# Patient Record
Sex: FEMALE | Race: WHITE | NOT HISPANIC OR LATINO | URBAN - METROPOLITAN AREA
[De-identification: names, ages, dates, MRNs, and addresses within clinical notes are randomized per-mention and may not be internally consistent; named-entity substitution may affect disease eponyms.]

---

## 2018-02-22 ENCOUNTER — APPOINTMENT (RX ONLY)
Dept: URBAN - METROPOLITAN AREA CLINIC 349 | Facility: CLINIC | Age: 43
Setting detail: DERMATOLOGY
End: 2018-02-22

## 2018-02-22 DIAGNOSIS — L29.89 OTHER PRURITUS: ICD-10-CM

## 2018-02-22 DIAGNOSIS — L91.8 OTHER HYPERTROPHIC DISORDERS OF THE SKIN: ICD-10-CM

## 2018-02-22 DIAGNOSIS — D22 MELANOCYTIC NEVI: ICD-10-CM

## 2018-02-22 DIAGNOSIS — L82.1 OTHER SEBORRHEIC KERATOSIS: ICD-10-CM

## 2018-02-22 DIAGNOSIS — L29.8 OTHER PRURITUS: ICD-10-CM

## 2018-02-22 PROBLEM — D22.5 MELANOCYTIC NEVI OF TRUNK: Status: ACTIVE | Noted: 2018-02-22

## 2018-02-22 PROBLEM — E05.90 THYROTOXICOSIS, UNSPECIFIED WITHOUT THYROTOXIC CRISIS OR STORM: Status: ACTIVE | Noted: 2018-02-22

## 2018-02-22 PROBLEM — E03.9 HYPOTHYROIDISM, UNSPECIFIED: Status: ACTIVE | Noted: 2018-02-22

## 2018-02-22 PROBLEM — F41.9 ANXIETY DISORDER, UNSPECIFIED: Status: ACTIVE | Noted: 2018-02-22

## 2018-02-22 PROBLEM — L55.1 SUNBURN OF SECOND DEGREE: Status: ACTIVE | Noted: 2018-02-22

## 2018-02-22 PROBLEM — L20.84 INTRINSIC (ALLERGIC) ECZEMA: Status: ACTIVE | Noted: 2018-02-22

## 2018-02-22 PROCEDURE — ? RECOMMENDATIONS

## 2018-02-22 PROCEDURE — ? COUNSELING

## 2018-02-22 PROCEDURE — 99203 OFFICE O/P NEW LOW 30 MIN: CPT

## 2018-02-22 ASSESSMENT — LOCATION SIMPLE DESCRIPTION DERM
LOCATION SIMPLE: RIGHT BACK
LOCATION SIMPLE: CHEST
LOCATION SIMPLE: LEFT UPPER BACK
LOCATION SIMPLE: RIGHT LOWER BACK
LOCATION SIMPLE: RIGHT UPPER BACK

## 2018-02-22 ASSESSMENT — LOCATION DETAILED DESCRIPTION DERM
LOCATION DETAILED: LEFT MID-UPPER BACK
LOCATION DETAILED: RIGHT SUPERIOR LATERAL LOWER BACK
LOCATION DETAILED: MIDDLE STERNUM
LOCATION DETAILED: RIGHT SUPERIOR LATERAL UPPER BACK
LOCATION DETAILED: LEFT SUPERIOR UPPER BACK
LOCATION DETAILED: LEFT INFERIOR MEDIAL UPPER BACK
LOCATION DETAILED: RIGHT MID-UPPER BACK

## 2018-02-22 ASSESSMENT — LOCATION ZONE DERM: LOCATION ZONE: TRUNK

## 2018-02-22 NOTE — PROCEDURE: RECOMMENDATIONS
Recommendations (Free Text): Bathe with Dove for sensitive skin bar soap \\nCerave cream apply twice daily for dryness.  Anti itch with pramosone cream.
Detail Level: Zone

## 2018-12-10 PROBLEM — Z80.41 FAMILY HISTORY OF OVARIAN CANCER: Status: ACTIVE | Noted: 2018-12-10

## 2019-01-21 ENCOUNTER — APPOINTMENT (RX ONLY)
Dept: URBAN - METROPOLITAN AREA CLINIC 349 | Facility: CLINIC | Age: 44
Setting detail: DERMATOLOGY
End: 2019-01-21

## 2019-01-21 DIAGNOSIS — L21.8 OTHER SEBORRHEIC DERMATITIS: ICD-10-CM

## 2019-01-21 DIAGNOSIS — L72.0 EPIDERMAL CYST: ICD-10-CM

## 2019-01-21 DIAGNOSIS — Z71.89 OTHER SPECIFIED COUNSELING: ICD-10-CM

## 2019-01-21 DIAGNOSIS — D22 MELANOCYTIC NEVI: ICD-10-CM

## 2019-01-21 PROBLEM — D22.5 MELANOCYTIC NEVI OF TRUNK: Status: ACTIVE | Noted: 2019-01-21

## 2019-01-21 PROBLEM — L70.0 ACNE VULGARIS: Status: ACTIVE | Noted: 2019-01-21

## 2019-01-21 PROBLEM — J30.1 ALLERGIC RHINITIS DUE TO POLLEN: Status: ACTIVE | Noted: 2019-01-21

## 2019-01-21 PROBLEM — D22.4 MELANOCYTIC NEVI OF SCALP AND NECK: Status: ACTIVE | Noted: 2019-01-21

## 2019-01-21 PROCEDURE — 11306 SHAVE SKIN LESION 0.6-1.0 CM: CPT

## 2019-01-21 PROCEDURE — A4550 SURGICAL TRAYS: HCPCS

## 2019-01-21 PROCEDURE — ? SHAVE REMOVAL

## 2019-01-21 PROCEDURE — ? DEFER

## 2019-01-21 PROCEDURE — ? PRESCRIPTION

## 2019-01-21 PROCEDURE — ? TREATMENT REGIMEN

## 2019-01-21 PROCEDURE — 11301 SHAVE SKIN LESION 0.6-1.0 CM: CPT

## 2019-01-21 PROCEDURE — ? OTHER

## 2019-01-21 PROCEDURE — 99213 OFFICE O/P EST LOW 20 MIN: CPT | Mod: 25

## 2019-01-21 PROCEDURE — ? COUNSELING

## 2019-01-21 PROCEDURE — ? EDUCATIONAL RESOURCES PROVIDED

## 2019-01-21 RX ORDER — KETOCONAZOLE 20.5 MG/ML
SHAMPOO, SUSPENSION TOPICAL
Qty: 1 | Refills: 3 | Status: ERX | COMMUNITY
Start: 2019-01-21

## 2019-01-21 RX ORDER — CLOBETASOL PROPIONATE 0.5 MG/ML
SOLUTION TOPICAL
Qty: 1 | Refills: 3 | Status: ERX | COMMUNITY
Start: 2019-01-21

## 2019-01-21 RX ADMIN — CLOBETASOL PROPIONATE: 0.5 SOLUTION TOPICAL at 20:51

## 2019-01-21 RX ADMIN — KETOCONAZOLE: 20.5 SHAMPOO, SUSPENSION TOPICAL at 21:02

## 2019-01-21 ASSESSMENT — LOCATION SIMPLE DESCRIPTION DERM
LOCATION SIMPLE: POSTERIOR NECK
LOCATION SIMPLE: RIGHT UPPER BACK
LOCATION SIMPLE: RIGHT ANTERIOR NECK
LOCATION SIMPLE: HAIR
LOCATION SIMPLE: LEFT UPPER BACK

## 2019-01-21 ASSESSMENT — LOCATION DETAILED DESCRIPTION DERM
LOCATION DETAILED: LEFT INFERIOR LATERAL UPPER BACK
LOCATION DETAILED: RIGHT SUPERIOR MEDIAL UPPER BACK
LOCATION DETAILED: HAIR
LOCATION DETAILED: RIGHT MEDIAL TRAPEZIAL NECK
LOCATION DETAILED: RIGHT CLAVICULAR NECK

## 2019-01-21 ASSESSMENT — LOCATION ZONE DERM
LOCATION ZONE: NECK
LOCATION ZONE: SCALP
LOCATION ZONE: TRUNK

## 2019-01-21 NOTE — PROCEDURE: OTHER
Detail Level: Zone
Other (Free Text): Pt stated she is using a shampoo that is made for psoriasis but is unsure of what it is called.\\n\\nSuggested that pt washes her hair on a daily basis and to blow dry it. Pt has curly hair and washes her hair every other day to avoid frizz.
Note Text (......Xxx Chief Complaint.): This diagnosis correlates with the

## 2019-01-21 NOTE — PROCEDURE: TREATMENT REGIMEN
Detail Level: Zone
Initiate Treatment: Apply Clobetasol scalp solution twice daily when flared. Wash hair daily for two weeks with Ketoconazole shampoo, then twice weekly for maintenance.

## 2019-01-21 NOTE — PROCEDURE: SHAVE REMOVAL
Consent was obtained from the patient. The risks and benefits to therapy were discussed in detail. Specifically, the risks of infection, scarring, bleeding, prolonged wound healing, incomplete removal, allergy to anesthesia, nerve injury and recurrence were addressed. Prior to the procedure, the treatment site was clearly identified and confirmed by the patient. All components of Universal Protocol/PAUSE Rule completed.
X Size Of Lesion In Cm (Optional): 0
Size Of Lesion In Cm (Required): 0.6
Anesthesia Volume In Cc: 0.3
Path Notes (To The Dermatopathologist): Please check margins.
Notification Instructions: Patient will be notified of biopsy results. However, patient instructed to call the office if not contacted within 2 weeks.
Medical Necessity Information: It is in your best interest to select a reason for this procedure from the list below. All of these items fulfill various CMS LCD requirements except the new and changing color options.
Render Post-Care Instructions In Note?: no
Post-Care Instructions: I reviewed with the patient in detail post-care instructions. Patient is to keep the biopsy site dry overnight, and then apply bacitracin twice daily until healed. Patient may apply hydrogen peroxide soaks to remove any crusting.
Accession #: [id number]
Biopsy Method: Dermablade
Billing Type: Third-Party Bill
Detail Level: Detailed
Hemostasis: Electrocautery
Wound Care: Vaseline
Bill For Surgical Tray: yes
Anesthesia Type: 1% Xylocaine without epinephrine
Size Of Lesion In Cm (Required): 0.8
Medical Necessity Clause: This procedure was medically necessary because the lesion is being traumatized

## 2019-01-23 ENCOUNTER — HOSPITAL ENCOUNTER (OUTPATIENT)
Dept: SURGERY | Age: 44
Discharge: HOME | End: 2019-01-23

## 2019-01-23 VITALS — HEIGHT: 67 IN | BODY MASS INDEX: 20.4 KG/M2 | WEIGHT: 130 LBS

## 2019-01-23 RX ORDER — PREGABALIN 100 MG/1
100 CAPSULE ORAL 2 TIMES DAILY
COMMUNITY

## 2019-01-23 NOTE — PERIOP NOTES
Patient verified name and . Order for consent NOT found in EHR and matches case posting; patient verifies procedure. Type 1B surgery, PAT phone assessment complete. Orders NOT received. Labs per surgeon: No orders received. Labs per anesthesia protocol: HGB DOS; order signed and held in [address] [facility]. Patient unable to come to hospital prior to surgery to have lab work completed. Seen by PCP 10/9/18 with HR 37-44, s/p posterior cervical fusion on 18. Patient instructed to go to emergency room, patient reports \"I took my care into my own hands,\" went home d/c all medications and went to ER on 10/11/18 with c/o rapid heart rate, patient placed on monitor with HR between 70-90. ER note states \"tachycardia is possible due to her coming off her Norco.\" Patient reports today HR is \"back to normal.\" Anesthesia to review records and determine if EKG is needed DOS. Patient answered medical/surgical history questions at their best of ability. All prior to admission medications documented in [facility]. Patient instructed to take the following medications the day of surgery according to anesthesia guidelines with a small sip of water: Lyrica, Oxycodone PRN, Tizanidine PRN, Synthroid. Hold all vitamins 7 days prior to surgery and NSAIDS 5 days prior to surgery and naproxen. Patient instructed on the following:  Arrive at [address], time of arrival to be called the day before by 1700  NPO after midnight including gum, mints, and ice chips  Responsible adult must drive patient to the hospital, stay during surgery, and patient will need supervision 24 hours after anesthesia  Use non-moisturizing soap in shower the night before surgery and on the morning of surgery  All piercings must be removed prior to arrival.    Leave all valuables (money and jewelry) at home but bring insurance card and ID on       DOS.    Do not wear make-up, nail polish, lotions, cologne, perfumes, powders, or oil on skin. Patient teach back successful and patient demonstrates knowledge of instruction.

## 2019-01-23 NOTE — PERIOP NOTES
Dr. Jerica Etienne, anesthesia, reviewed pt chart including ED note (10/11/18), PCP note (10/9/18) and note documented in ED note re: pt's report of esophagus being torn during intubation or extubation during 2011 surgery @ [address] [place] [address]. No new orders received. OK to proceed.

## 2019-01-23 NOTE — PERIOP NOTES
Per ED note dated 10/11/18, esophagus was torn during intubation or extubation during 2011 surgery at [address] [place] [address]. No surgery noted in 2011. Patient had surgery 2012 and per Dr. Shruti Bazan (anesthesia) note dated 11/20/12 \"no anesthetic complications. \"    Anesthesia to review.

## 2019-01-28 ENCOUNTER — ANESTHESIA EVENT (OUTPATIENT)
Dept: SURGERY | Age: 44
End: 2019-01-28
Payer: COMMERCIAL

## 2019-01-28 RX ORDER — SODIUM CHLORIDE, SODIUM LACTATE, POTASSIUM CHLORIDE, CALCIUM CHLORIDE 600; 310; 30; 20 MG/100ML; MG/100ML; MG/100ML; MG/100ML
75 INJECTION, SOLUTION INTRAVENOUS CONTINUOUS
Status: CANCELLED | OUTPATIENT
Start: 2019-01-28 | End: 2019-01-29

## 2019-01-28 RX ORDER — MIDAZOLAM HYDROCHLORIDE 1 MG/ML
2 INJECTION, SOLUTION INTRAMUSCULAR; INTRAVENOUS
Status: CANCELLED | OUTPATIENT
Start: 2019-01-28 | End: 2019-01-29

## 2019-01-28 RX ORDER — FENTANYL CITRATE 50 UG/ML
100 INJECTION, SOLUTION INTRAMUSCULAR; INTRAVENOUS ONCE
Status: CANCELLED | OUTPATIENT
Start: 2019-01-28 | End: 2019-01-28

## 2019-01-29 ENCOUNTER — ANESTHESIA (OUTPATIENT)
Dept: SURGERY | Age: 44
End: 2019-01-29
Payer: COMMERCIAL

## 2019-01-29 ENCOUNTER — HOSPITAL ENCOUNTER (OUTPATIENT)
Age: 44
Setting detail: OUTPATIENT SURGERY
Discharge: HOME | End: 2019-01-29
Attending: OBSTETRICS & GYNECOLOGY | Admitting: OBSTETRICS & GYNECOLOGY
Payer: COMMERCIAL

## 2019-01-29 VITALS
SYSTOLIC BLOOD PRESSURE: 141 MMHG | DIASTOLIC BLOOD PRESSURE: 83 MMHG | WEIGHT: 132 LBS | RESPIRATION RATE: 15 BRPM | HEART RATE: 69 BPM | TEMPERATURE: 97.9 F | BODY MASS INDEX: 20.67 KG/M2 | OXYGEN SATURATION: 98 %

## 2019-01-29 DIAGNOSIS — N93.9 ABNORMAL UTERINE BLEEDING (AUB): Primary | ICD-10-CM

## 2019-01-29 LAB
HCG UR QL: NEGATIVE
HGB BLD-MCNC: 13.5 G/DL (ref 11.7–15.4)

## 2019-01-29 PROCEDURE — 74011250636 HC RX REV CODE- 250/636

## 2019-01-29 PROCEDURE — 81025 URINE PREGNANCY TEST: CPT

## 2019-01-29 PROCEDURE — 76010000138 HC OR TIME 0.5 TO 1 HR: Performed by: OBSTETRICS & GYNECOLOGY

## 2019-01-29 PROCEDURE — 74011250637 HC RX REV CODE- 250/637: Performed by: ANESTHESIOLOGY

## 2019-01-29 PROCEDURE — 88305 TISSUE EXAM BY PATHOLOGIST: CPT

## 2019-01-29 PROCEDURE — 74011250636 HC RX REV CODE- 250/636: Performed by: ANESTHESIOLOGY

## 2019-01-29 PROCEDURE — 77030020782 HC GWN BAIR PAWS FLX 3M -B: Performed by: ANESTHESIOLOGY

## 2019-01-29 PROCEDURE — 76060000032 HC ANESTHESIA 0.5 TO 1 HR: Performed by: OBSTETRICS & GYNECOLOGY

## 2019-01-29 PROCEDURE — 77030034928 HC DEV UTER SURSND KT HOLO -G1: Performed by: OBSTETRICS & GYNECOLOGY

## 2019-01-29 PROCEDURE — 85018 HEMOGLOBIN: CPT

## 2019-01-29 PROCEDURE — 77030010509 HC AIRWY LMA MSK TELE -A: Performed by: ANESTHESIOLOGY

## 2019-01-29 PROCEDURE — 77030032490 HC SLV COMPR SCD KNE COVD -B: Performed by: OBSTETRICS & GYNECOLOGY

## 2019-01-29 PROCEDURE — 77030018836 HC SOL IRR NACL ICUM -A: Performed by: OBSTETRICS & GYNECOLOGY

## 2019-01-29 PROCEDURE — 76210000016 HC OR PH I REC 1 TO 1.5 HR: Performed by: OBSTETRICS & GYNECOLOGY

## 2019-01-29 PROCEDURE — 77030012317 HC CATH URET INT COVD -A: Performed by: OBSTETRICS & GYNECOLOGY

## 2019-01-29 RX ORDER — HYDROCODONE BITARTRATE AND ACETAMINOPHEN 5; 325 MG/1; MG/1
2 TABLET ORAL AS NEEDED
Status: DISCONTINUED | OUTPATIENT
Start: 2019-01-29 | End: 2019-01-29 | Stop reason: HOSPADM

## 2019-01-29 RX ORDER — HYDROMORPHONE HYDROCHLORIDE 2 MG/ML
0.5 INJECTION, SOLUTION INTRAMUSCULAR; INTRAVENOUS; SUBCUTANEOUS
Status: DISCONTINUED | OUTPATIENT
Start: 2019-01-29 | End: 2019-01-29 | Stop reason: HOSPADM

## 2019-01-29 RX ORDER — FENTANYL CITRATE 50 UG/ML
INJECTION, SOLUTION INTRAMUSCULAR; INTRAVENOUS AS NEEDED
Status: DISCONTINUED | OUTPATIENT
Start: 2019-01-29 | End: 2019-01-29 | Stop reason: HOSPADM

## 2019-01-29 RX ORDER — LIDOCAINE HYDROCHLORIDE 20 MG/ML
INJECTION, SOLUTION EPIDURAL; INFILTRATION; INTRACAUDAL; PERINEURAL AS NEEDED
Status: DISCONTINUED | OUTPATIENT
Start: 2019-01-29 | End: 2019-01-29 | Stop reason: HOSPADM

## 2019-01-29 RX ORDER — SODIUM CHLORIDE, SODIUM LACTATE, POTASSIUM CHLORIDE, CALCIUM CHLORIDE 600; 310; 30; 20 MG/100ML; MG/100ML; MG/100ML; MG/100ML
75 INJECTION, SOLUTION INTRAVENOUS CONTINUOUS
Status: DISCONTINUED | OUTPATIENT
Start: 2019-01-29 | End: 2019-01-29 | Stop reason: HOSPADM

## 2019-01-29 RX ORDER — DEXAMETHASONE SODIUM PHOSPHATE 4 MG/ML
INJECTION, SOLUTION INTRA-ARTICULAR; INTRALESIONAL; INTRAMUSCULAR; INTRAVENOUS; SOFT TISSUE AS NEEDED
Status: DISCONTINUED | OUTPATIENT
Start: 2019-01-29 | End: 2019-01-29 | Stop reason: HOSPADM

## 2019-01-29 RX ORDER — OXYCODONE HYDROCHLORIDE 5 MG/1
5 TABLET ORAL
Status: DISCONTINUED | OUTPATIENT
Start: 2019-01-29 | End: 2019-01-29 | Stop reason: HOSPADM

## 2019-01-29 RX ORDER — FENTANYL CITRATE 50 UG/ML
100 INJECTION, SOLUTION INTRAMUSCULAR; INTRAVENOUS ONCE
Status: DISCONTINUED | OUTPATIENT
Start: 2019-01-29 | End: 2019-01-29 | Stop reason: HOSPADM

## 2019-01-29 RX ORDER — LIDOCAINE HYDROCHLORIDE 10 MG/ML
0.1 INJECTION INFILTRATION; PERINEURAL AS NEEDED
Status: DISCONTINUED | OUTPATIENT
Start: 2019-01-29 | End: 2019-01-29 | Stop reason: HOSPADM

## 2019-01-29 RX ORDER — PROPOFOL 10 MG/ML
INJECTION, EMULSION INTRAVENOUS AS NEEDED
Status: DISCONTINUED | OUTPATIENT
Start: 2019-01-29 | End: 2019-01-29 | Stop reason: HOSPADM

## 2019-01-29 RX ORDER — KETOROLAC TROMETHAMINE 30 MG/ML
INJECTION, SOLUTION INTRAMUSCULAR; INTRAVENOUS AS NEEDED
Status: DISCONTINUED | OUTPATIENT
Start: 2019-01-29 | End: 2019-01-29 | Stop reason: HOSPADM

## 2019-01-29 RX ORDER — ONDANSETRON 2 MG/ML
INJECTION INTRAMUSCULAR; INTRAVENOUS AS NEEDED
Status: DISCONTINUED | OUTPATIENT
Start: 2019-01-29 | End: 2019-01-29 | Stop reason: HOSPADM

## 2019-01-29 RX ORDER — MIDAZOLAM HYDROCHLORIDE 1 MG/ML
2 INJECTION, SOLUTION INTRAMUSCULAR; INTRAVENOUS
Status: COMPLETED | OUTPATIENT
Start: 2019-01-29 | End: 2019-01-29

## 2019-01-29 RX ORDER — OXYCODONE AND ACETAMINOPHEN 5; 325 MG/1; MG/1
1 TABLET ORAL
Qty: 6 TAB | Refills: 0 | Status: SHIPPED | OUTPATIENT
Start: 2019-01-29

## 2019-01-29 RX ADMIN — FENTANYL CITRATE 50 MCG: 50 INJECTION, SOLUTION INTRAMUSCULAR; INTRAVENOUS at 13:55

## 2019-01-29 RX ADMIN — ONDANSETRON 4 MG: 2 INJECTION INTRAMUSCULAR; INTRAVENOUS at 14:05

## 2019-01-29 RX ADMIN — HYDROCODONE BITARTRATE AND ACETAMINOPHEN 1 TABLET: 5; 325 TABLET ORAL at 15:46

## 2019-01-29 RX ADMIN — PROPOFOL 200 MG: 10 INJECTION, EMULSION INTRAVENOUS at 13:56

## 2019-01-29 RX ADMIN — HYDROMORPHONE HYDROCHLORIDE 0.5 MG: 2 INJECTION, SOLUTION INTRAMUSCULAR; INTRAVENOUS; SUBCUTANEOUS at 14:59

## 2019-01-29 RX ADMIN — HYDROMORPHONE HYDROCHLORIDE 0.5 MG: 2 INJECTION, SOLUTION INTRAMUSCULAR; INTRAVENOUS; SUBCUTANEOUS at 15:04

## 2019-01-29 RX ADMIN — MIDAZOLAM 2 MG: 1 INJECTION INTRAMUSCULAR; INTRAVENOUS at 13:00

## 2019-01-29 RX ADMIN — DEXAMETHASONE SODIUM PHOSPHATE 10 MG: 4 INJECTION, SOLUTION INTRA-ARTICULAR; INTRALESIONAL; INTRAMUSCULAR; INTRAVENOUS; SOFT TISSUE at 14:05

## 2019-01-29 RX ADMIN — LIDOCAINE HYDROCHLORIDE 100 MG: 20 INJECTION, SOLUTION EPIDURAL; INFILTRATION; INTRACAUDAL; PERINEURAL at 13:56

## 2019-01-29 RX ADMIN — KETOROLAC TROMETHAMINE 30 MG: 30 INJECTION, SOLUTION INTRAMUSCULAR; INTRAVENOUS at 14:14

## 2019-01-29 RX ADMIN — SODIUM CHLORIDE, SODIUM LACTATE, POTASSIUM CHLORIDE, AND CALCIUM CHLORIDE: 600; 310; 30; 20 INJECTION, SOLUTION INTRAVENOUS at 14:18

## 2019-01-29 RX ADMIN — SODIUM CHLORIDE, SODIUM LACTATE, POTASSIUM CHLORIDE, AND CALCIUM CHLORIDE 75 ML/HR: 600; 310; 30; 20 INJECTION, SOLUTION INTRAVENOUS at 13:00

## 2019-01-29 RX ADMIN — FENTANYL CITRATE 50 MCG: 50 INJECTION, SOLUTION INTRAMUSCULAR; INTRAVENOUS at 14:14

## 2019-01-29 NOTE — DISCHARGE INSTRUCTIONS
INSTRUCTIONS FOLLOWING HYSTEROSCOPY D&C with ABLATION    ACTIVITY   Limited activity today; increase as tolerated tomorrow, but no vigorous exercise   Shower only; no tub baths   No douches, tampons or intercourse until your doctor releases you (at least 2 weeks)   May return to work or school as directed by your doctor    DIET   Clear liquids until no nausea or vomiting   Advance to regular diet as tolerated    PAIN   Expect a moderate amount of pain.  Take pain medication as directed by your doctor. If no prescription for pain is sent home with you, take the appropriate dose of your commonly used pain medication.  Call your doctor if pain is NOT relieved by medication.  DO NOT take aspirin or blood thinners until directed by your doctor. DRESSING CARE Change gema pads as needed. FOLLOW-UP PHONE [address] will be made by nursing staff   If you have any problems, call your doctor as needed. CALL YOUR DOCTOR IF   Excessive bleeding that soaks a pad in an hour   Temperature of 101°F or above   Green or yellow, smelly discharge   Unable to urinate by bedtime   Nausea and vomiting that does not stop by bedtime    AFTER ANESTHESIA   For the first 24 hours: DO NOT Drive, Drink alcoholic beverages, or Make important decisions.  Beware of dizziness following anesthesia and while taking pain medication.    Plan to stay tonight within 1 hours drive of the hospital

## 2019-01-29 NOTE — PERIOP NOTES
Patient dressed and ready to go home, waiting for Dr Jt Gupta to come and sign prescription for pain medicines

## 2019-01-29 NOTE — ANESTHESIA POSTPROCEDURE EVALUATION
Procedure(s):  DILATATION AND CURETTAGE HYSTEROSCOPY ENDOMETRIAL ABLATION/NOVASURE.     Anesthesia Post Evaluation      Multimodal analgesia: multimodal analgesia used between 6 hours prior to anesthesia start to PACU discharge  Patient location during evaluation: bedside  Patient participation: complete - patient participated  Level of consciousness: awake and alert  Pain score: 3  Pain management: adequate  Airway patency: patent  Anesthetic complications: no  Cardiovascular status: acceptable and hemodynamically stable  Respiratory status: acceptable  Hydration status: acceptable  Post anesthesia nausea and vomiting:  none      Visit Vitals  BP (P) 138/86   Pulse (P) 71   Temp 36.6 °C (97.9 °F)   Resp (P) 15   Wt 59.9 kg (132 lb)   SpO2 (P) 100%   BMI 20.67 kg/m²

## 2019-01-29 NOTE — H&P
Gynecology Major Surgery History and Physical    Chandu Pinto  [id number]    Subjective:      Chief complaint:  abnormal uterine bleeding    Deborrmohinder Rodriguez is a 39 y.o.  female with a history of abnormal uterine bleeding. Previous treatment measures include nonsteroidal anti-inflammatory drugs (NSAID's), observation and oral contraceptives. Ultrasound findings include thickened endometrium and 1 cm thickened endo with 1.7 cm fundal thickening and possible polyp. She is admitted for Procedure(s) (LRB):  DILATATION AND CURETTAGE HYSTEROSCOPY ENDOMETRIAL ABLATION/NOVASURE (N/A)    The current method of family planning is vasectomy. Past Medical History:   Diagnosis Date    Adverse effect of anesthesia     decreased HR and BP after cervical fusion on 9/25/18, hypotension improved s/p blood transfusion. 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  Back pain     Chronic pain     neck and back    Chronic pain syndrome 4/25/2016    Difficult intubation     Per ED note dated 10/11/18, esophagus was torn during intubation or extubation during 2011 surgery at [address] [place] [address]. No surgery noted in 2011. Patient had surgery 2012 and per Dr. Yeni Anne (anesthesia) note dated 11/20/12 \"no anesthetic complications. \"    Displacement of cervical intervertebral disc without myelopathy 11/20/2012    Fibromyalgia     History of bradycardia     Seen by PCP 10/9/18 with HR 38-44, s/p posterior cervical fusion on 9/25/18. Patient instructed to go to emergency room, patient reports \"I took my care into my own hands,\" went home d/c all medications and went to ER on 10/11/18 with c/o rapid heart rate, patient placed on monitor with HR between 70-90.  ER note states \"tachycardia is possible due to her coming off her Norco.\"     History of depression     History of pleural effusion 09/2018    Hypothyroidism     Lumbar disc disease with radiculopathy 1/20/2016    Osteopenia     Reflex sympathetic dystrophy     Sciatica      Past Surgical History:   Procedure Laterality Date    HX BACK SURGERY      Neck surgery - \"synthetic C5\"    HX BREAST AUGMENTATION      HX CERVICAL DISKECTOMY      HX CERVICAL FUSION  2018    HX CYST REMOVAL Right     Wrist ganglion excision     HX CYST REMOVAL Left     Wrist ganglion excision    HX OTHER SURGICAL      spine surg    HX OTHER SURGICAL  2016    Neurostimilator- Mid back, low back and hip.  IMPLANT BREAST SILICONE/EQ Bilateral 0754    augmentation with saline implants     OB History      Para Term  AB Living    6 3 3   3 3    SAB TAB Ectopic Molar Multiple Live Births    3       1 4          Allergies   Allergen Reactions    Tramadol Nausea and Vomiting       Prior to Admission Medications   Prescriptions Last Dose Informant Patient Reported? Taking? SYNTHROID 100 mcg tablet 2019 at Unknown time  No Yes   Sig: Take 1 Tab by mouth Daily (before breakfast). biotin 1 mg tab 2019 at Unknown time  Yes Yes   Sig: Take  by mouth.   calcium-vitamin D 600 mg(1,500mg) -200 unit tab 2019 at Unknown time  Yes Yes   Sig: Take  by mouth. naproxen (NAPROSYN) 500 mg tablet 2019 at Unknown time  Yes Yes   Sig: Take  by mouth every six to eight (6-8) hours as needed. oxyCODONE IR (ROXICODONE) 10 mg tab immediate release tablet   Yes No   Sig: TK 1 T PO  Q 8 H PRN P   pregabalin (LYRICA) 100 mg capsule 2019 at Unknown time  Yes Yes   Sig: Take 100 mg by mouth two (2) times a day. tiZANidine (ZANAFLEX) 4 mg tablet   Yes No   Sig: every six (6) hours as needed. zolpidem (AMBIEN) 10 mg tablet   Yes No   Sig: Take 10 mg by mouth.       Facility-Administered Medications: None       Family History   Problem Relation Age of Onset    Heart Disease Mother         hole in heart    Migraines Mother     Hypertension Father     Bipolar Disorder Brother     Other Brother         DDD    Migraines Maternal Aunt     Breast Cancer Paternal Grandmother         pre menopausal    Cancer Paternal Grandmother         Started in the uterus or ovary    Bipolar Disorder Sister     Other Sister         back complications    Other Sister         IBS and back complications    Cancer Maternal Grandmother         luekemia    Heart Failure Maternal Grandmother     Macular Degen Maternal Grandmother     Cancer Maternal Grandfather         Oral Ca    Lung Cancer Paternal Grandfather     Breast Cancer Other         Maternal great aunts x2      Social History     Socioeconomic History    Marital status:      Spouse name: Not on file    Number of children: Not on file    Years of education: Not on file    Highest education level: Not on file   Social Needs    Financial resource strain: Not on file    Food insecurity - worry: Not on file    Food insecurity - inability: Not on file   [email or web address] needs - medical: Not on file   [email or web address] needs - non-medical: Not on file   Occupational History    Not on file   Tobacco Use    Smoking status: Former Smoker     Packs/day: 1.50     Years: 7.00     Pack years: 10.50     Last attempt to quit: 3/1/2013     Years since quittin.9    Smokeless tobacco: Never Used   Substance and Sexual Activity    Alcohol use: Yes     Alcohol/week: 3.0 oz     Types: 6 Cans of beer per week     Comment: social    Drug use: No    Sexual activity: Yes     Partners: Male     Birth control/protection: Pill     Comment: Vasectomy   Other Topics Concern    Not on file   Social History Narrative    Not on file         Review of Systems:  Pertinent items are noted in HPI.      Objective:     Vitals:    19 1231   BP: 101/76   Pulse: 78   Resp: 18   SpO2: 97%   Weight: 132 lb (59.9 kg)       Physical Exam:  General:  alert, cooperative, no distress, appears stated age   Lungs:  clear to auscultation bilaterally   Heart:  regular rate and rhythm, S1, S2 normal, no murmur, click, rub or gallop   Abdomen: soft, non-tender. Bowel sounds normal. No masses,  no organomegaly. Assessment:     abnormal uterine bleeding with abnormal uterine bleeding  US shows evidence of adenomyosis and pt understands may have increased pain after ablation- see office notes       Plan:     1. Procedure(s) (LRB):  DILATATION AND CURETTAGE HYSTEROSCOPY ENDOMETRIAL ABLATION/NOVASURE (N/A)  Discussed the risk of surgery including the risks of bleeding, infection, DVT, and surgical injuries to internal organs including but not limited to the bowels, bladder, rectum, and female reproductive organs. The patient understands the risks, any and all questions were answered to the patient's satisfaction.

## 2019-01-29 NOTE — ANESTHESIA PREPROCEDURE EVALUATION
Anesthetic History     PONV          Review of Systems / Medical History  Patient summary reviewed and pertinent labs reviewed    Pulmonary          Smoker         Neuro/Psych         Psychiatric history     Cardiovascular                  Exercise tolerance: >4 METS     GI/Hepatic/Renal  Within defined limits              Endo/Other      Hypothyroidism: well controlled  Arthritis     Other Findings   Comments: Chronic Pain           Physical Exam    Airway  Mallampati: II  TM Distance: 4 - 6 cm  Neck ROM: normal range of motion   Mouth opening: Normal     Cardiovascular    Rhythm: regular  Rate: normal         Dental  No notable dental hx       Pulmonary  Breath sounds clear to auscultation               Abdominal  GI exam deferred       Other Findings            Anesthetic Plan    ASA: 2  Anesthesia type: general            Anesthetic plan and risks discussed with: Patient and Spouse

## 2019-01-29 NOTE — OP NOTES
HYSTEROSCOPY D & C WITH ENDOMETRIAL ABLATION FULL OP NOTE        PATIENT: Joceline Osborne  MRN: [id number]      DATE OF PROCEDURE:  1/29/2019    PREOPERATIVE DIAGNOSIS:  Dysfunctional uterine bleeding [N93.8]    POSTOPERATIVE DIAGNOSIS:  Dysfunctional uterine bleeding [N93.8]    PROCEDURE: Hysteroscopy, dilatation & curettage, NovaSure ablation. SURGEON:  Domi Aguilar DO    ASSISTANT:  none    ANESTHESIA: none General endotracheal anesthesia. EBL: minimal cc    FINDINGS: small 1 cm anterior fundal polyp normal ostia normal tissue     PROCEDURE: Patient was placed on the operating table in the supine position. Time out was done to confirm the operating procedure, surgeon, patient and site. Once confirmed by the team, procedure was started. Patient was placed under general endotracheal anesthesia. She was prepped and draped in the usual fashion for vaginal surgery. Cervix was visualized with the aid of a weighted vaginal speculum and grasped with a single-tooth tenaculum and sounded to 8 cm. The cervix was then dilated to 23-Palauan. The diagnostic hysteroscope was then placed in the endometrial cavity. Thorough endometrial curettage was performed with endometrial curettings being sent for histologic review, using a medium sharp curette. The NovaSure ablation device was then opened. The uterus sounded to 8 cm with cervical length of 4 cm. The settings on the NovaSure ablation were set for a 1ength of 4 cm and a width of 3.5 cm with treatment for 1:24 at a power level of 77 after the instrument was placed and tested. The NovaSure ablater was then removed. Repeat hysteroscopy revealed a very adequate endometrial ablation. There was no bleeding. Instruments were removed. The patient went to the recovery room in satisfactory condition. She was given a prescription for percocet, is to follow up in two weeks, and will call with any increased pain, fever, or bleeding.

## 2019-02-15 RX ORDER — CLOBETASOL PROPIONATE 0.5 MG/ML
SOLUTION TOPICAL
Qty: 1 | Refills: 3 | Status: ACTIVE

## 2019-02-15 RX ORDER — KETOCONAZOLE 20.5 MG/ML
SHAMPOO, SUSPENSION TOPICAL
Qty: 1 | Refills: 3 | Status: ACTIVE

## 2019-02-18 ENCOUNTER — APPOINTMENT (RX ONLY)
Dept: URBAN - METROPOLITAN AREA CLINIC 349 | Facility: CLINIC | Age: 44
Setting detail: DERMATOLOGY
End: 2019-02-18

## 2019-02-18 DIAGNOSIS — D22 MELANOCYTIC NEVI: ICD-10-CM

## 2019-02-18 DIAGNOSIS — L72.0 EPIDERMAL CYST: ICD-10-CM

## 2019-02-18 DIAGNOSIS — L21.8 OTHER SEBORRHEIC DERMATITIS: ICD-10-CM

## 2019-02-18 PROCEDURE — ? TREATMENT REGIMEN

## 2019-02-18 PROCEDURE — ? PRESCRIPTION

## 2019-02-18 PROCEDURE — ? OTHER

## 2019-02-18 PROCEDURE — ? DEFER

## 2019-02-18 PROCEDURE — ? COUNSELING

## 2019-02-18 ASSESSMENT — LOCATION ZONE DERM
LOCATION ZONE: SCALP
LOCATION ZONE: TRUNK
LOCATION ZONE: NECK

## 2019-02-18 ASSESSMENT — LOCATION DETAILED DESCRIPTION DERM
LOCATION DETAILED: RIGHT CLAVICULAR NECK
LOCATION DETAILED: HAIR
LOCATION DETAILED: RIGHT SUPERIOR MEDIAL UPPER BACK

## 2019-02-18 ASSESSMENT — LOCATION SIMPLE DESCRIPTION DERM
LOCATION SIMPLE: RIGHT UPPER BACK
LOCATION SIMPLE: RIGHT ANTERIOR NECK
LOCATION SIMPLE: HAIR

## 2019-02-18 NOTE — PROCEDURE: OTHER
Other (Free Text): Pt stated she is using a shampoo that is made for psoriasis but is unsure of what it is called.\\n\\nSuggested that pt washes her hair on a daily basis and to blow dry it. Pt has curly hair and washes her hair every other day to avoid frizz.
Note Text (......Xxx Chief Complaint.): This diagnosis correlates with the
Detail Level: Zone

## 2019-02-27 ENCOUNTER — APPOINTMENT (RX ONLY)
Dept: URBAN - METROPOLITAN AREA CLINIC 349 | Facility: CLINIC | Age: 44
Setting detail: DERMATOLOGY
End: 2019-02-27

## 2019-02-27 DIAGNOSIS — L21.8 OTHER SEBORRHEIC DERMATITIS: ICD-10-CM | Status: IMPROVED

## 2019-02-27 DIAGNOSIS — D22 MELANOCYTIC NEVI: ICD-10-CM | Status: STABLE

## 2019-02-27 PROBLEM — M12.9 ARTHROPATHY, UNSPECIFIED: Status: ACTIVE | Noted: 2019-02-27

## 2019-02-27 PROBLEM — D22.5 MELANOCYTIC NEVI OF TRUNK: Status: ACTIVE | Noted: 2019-02-27

## 2019-02-27 PROBLEM — L85.3 XEROSIS CUTIS: Status: ACTIVE | Noted: 2019-02-27

## 2019-02-27 PROBLEM — L29.8 OTHER PRURITUS: Status: ACTIVE | Noted: 2019-02-27

## 2019-02-27 PROCEDURE — 99213 OFFICE O/P EST LOW 20 MIN: CPT

## 2019-02-27 PROCEDURE — ? PRESCRIPTION

## 2019-02-27 PROCEDURE — ? TREATMENT REGIMEN

## 2019-02-27 PROCEDURE — ? OTHER

## 2019-02-27 PROCEDURE — ? COUNSELING

## 2019-02-27 RX ORDER — KETOCONAZOLE 20.5 MG/ML
SHAMPOO, SUSPENSION TOPICAL
Qty: 1 | Refills: 6 | Status: ERX

## 2019-02-27 RX ORDER — ALCLOMETASONE DIPROPIONATE 0.5 MG/G
OINTMENT TOPICAL
Qty: 1 | Refills: 3 | Status: ERX | COMMUNITY
Start: 2019-02-27

## 2019-02-27 RX ADMIN — ALCLOMETASONE DIPROPIONATE: 0.5 OINTMENT TOPICAL at 16:10

## 2019-02-27 ASSESSMENT — LOCATION SIMPLE DESCRIPTION DERM
LOCATION SIMPLE: LEFT UPPER BACK
LOCATION SIMPLE: HAIR

## 2019-02-27 ASSESSMENT — LOCATION DETAILED DESCRIPTION DERM
LOCATION DETAILED: LEFT INFERIOR LATERAL UPPER BACK
LOCATION DETAILED: HAIR

## 2019-02-27 ASSESSMENT — LOCATION ZONE DERM
LOCATION ZONE: TRUNK
LOCATION ZONE: SCALP

## 2019-02-27 NOTE — PROCEDURE: OTHER
Note Text (......Xxx Chief Complaint.): This diagnosis correlates with the
Other (Free Text): Pt has been experiencing yellowish discharge from the corner of her eyes late at night or first thing in the morning. Suggested pt wash her face with the Ketoconazole shampoo as well. \\n\\nPts scalp looked pretty good today and there wasn’t any plaques or thick build up. Li mentioned that it is a chronic condition. Pt expressed understanding \\n\\nPt was worried if it was contagious. She stated that her  has same thing going on as well, Li reassured her that it was just a common condition.\\n\\nWill consider incorporating Ketoconazole cream if it doesn’t improve. Not much present today around eyes or neck
Other (Free Text): Observed biopsy site and it appears to be healing well with no sign of reoccurrence. Advised pt to continue to monitor at home and if she notices any signs of reoccurrence to contact the office. Pt expressed understanding.
Detail Level: Detailed
Detail Level: Zone

## 2019-02-27 NOTE — PROCEDURE: TREATMENT REGIMEN
Continue Regimen: Apply Clobetasol scalp solution twice daily when flared. Wash hair daily for two weeks with Ketoconazole shampoo, then twice weekly for maintenance.
Detail Level: Zone
Initiate Treatment: Apply alclometasone ointment to affected areas on face twice daily for two weeks

## 2019-03-06 ENCOUNTER — HOSPITAL ENCOUNTER (OUTPATIENT)
Dept: LAB | Age: 44
Discharge: HOME | End: 2019-03-06

## 2019-03-06 PROCEDURE — 88305 TISSUE EXAM BY PATHOLOGIST: CPT

## (undated) DEVICE — CYSTO: Brand: MEDLINE INDUSTRIES, INC.

## (undated) DEVICE — CARDINAL HEALTH FLEXIBLE LIGHT HANDLE COVER: Brand: CARDINAL HEALTH

## (undated) DEVICE — HANDPIECE ABLAT DIA6MM ENDOMET IMPED CTRL DEV DISP NOVASURE

## (undated) DEVICE — SOLUTION IV 1000ML 0.9% SOD CHL

## (undated) DEVICE — GOWN,REINF,POLY,ECL,PP SLV,XL: Brand: MEDLINE

## (undated) DEVICE — DRAPE,UNDERBUTTOCKS,PCH,STERILE: Brand: MEDLINE

## (undated) DEVICE — PVC URETHRAL CATHETER: Brand: DOVER

## (undated) DEVICE — CONTAINER SPEC FRMLN 120ML --

## (undated) DEVICE — AMD ANTIMICROBIAL NON-ADHERENT PAD,0.2% POLYHEXAMETHYLENE BIGUANIDE HCI (PHMB): Brand: TELFA

## (undated) DEVICE — DRAPE TWL SURG 16X26IN BLU ORB04] ALLCARE INC]

## (undated) DEVICE — TRAY PREP DRY W/ PREM GLV 2 APPL 6 SPNG 2 UNDPD 1 OVERWRAP

## (undated) DEVICE — SOLUTION IRRIG 3000ML 0.9% SOD CHL FLX CONT 0797208] ICU MEDICAL INC]

## (undated) DEVICE — KENDALL SCD EXPRESS SLEEVES, KNEE LENGTH, MEDIUM: Brand: KENDALL SCD

## (undated) DEVICE — PERI-PAD,MODERATE: Brand: CURITY